# Patient Record
Sex: FEMALE | Race: WHITE
[De-identification: names, ages, dates, MRNs, and addresses within clinical notes are randomized per-mention and may not be internally consistent; named-entity substitution may affect disease eponyms.]

---

## 2018-11-01 ENCOUNTER — HOSPITAL ENCOUNTER (OUTPATIENT)
Dept: HOSPITAL 92 - SCSMAMMO | Age: 43
Discharge: HOME | End: 2018-11-01
Attending: FAMILY MEDICINE
Payer: COMMERCIAL

## 2018-11-01 DIAGNOSIS — Z12.31: Primary | ICD-10-CM

## 2018-11-01 PROCEDURE — 77067 SCR MAMMO BI INCL CAD: CPT

## 2018-11-01 NOTE — MMO
BILATERAL SCREENING MAMMOGRAM:

 

DATE:  11/01/18 

 

HISTORY:  

42-year-old female for screening mammography. 

 

COMPARISON:  

None. 

 

FINDINGS:

Bilateral MLO and CC views of the breasts show extremely dense breast parenchyma, which may obscure l
esions on mammography. There is no evidence of suspicious mass, suspicious cluster of microcalcificat
ions, or area of architectural distortion. 

 

Interpretation of this mammogram was performed with the assistance of computer-aided detection.  

 

IMPRESSION: 

 

BIRADS 1:  Negative

 

Annual screening mammography is recommended. 

 

POS: [facility]LROY